# Patient Record
Sex: MALE | Race: WHITE | NOT HISPANIC OR LATINO | Employment: STUDENT | URBAN - METROPOLITAN AREA
[De-identification: names, ages, dates, MRNs, and addresses within clinical notes are randomized per-mention and may not be internally consistent; named-entity substitution may affect disease eponyms.]

---
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
## 2024-05-10 NOTE — PROGRESS NOTES
History of Present Illness  5/13/2024  ANYA is a 18 year old male, presenting for a routine ear cleaning. Doing well overall, he is on summer break from college.     11/20/2023  ANYA is a 18 year old male presenting for a follow-up ear cleaning. He is doing well and working out very often. He is still wearing his bilateral hearing aids. His right hearing aid is starting to irritate his ear, he thinks it needs to be molded again.     8/14/23  ANYA is a 17 year old male who presents for a follow-up ear cleaning. He is doing well. There are no concerns or complaints today.      04/24/2023:  ANYA is a 17 year old male who presents for a follow-up ear cleaning. He is doing well. There are no concerns or complaints today.      01/16/2023:  ANYA is a 17 year old male who presents for a follow-up for an ear cleaning. He is doing well. Patient plans to attend college next year. Patient continues to wear his hearing aids bilaterally.      10/10/2022:  ANYA is a 17 year old male who presents for a follow-up ear cleaning. Patient does report he has been needing people to repeat themselves when speaking to him. He continues to wear his hearing aids bilaterally without complication.      2/28/2022:  Anya is a 16 year old male here today for a routine ear cleaning. He denies any ear pain.      08/12/2021:  Anya is a 15 year old male here today for a routine ear cleaning. He is doing well  with his hearing aids and has no concerns today. No hearing changes, infections, or ear pain.   02/11/2021:  Anya is a 15 year old male here today for a routine ear cleaning. He has bilateral hearing aids and reports that he is doing well with these.     10/15/2020:   Anya Holder is a 15 year old male presenting for 4 month follow-up for cerumen impaction. He presents for his routine 12 week bilateral cerumen removal. He is doing well and reports no problems with his new hearing aids. He continues to be active and plays football.     "  06/11/2020:  Campos is a 14 year old male accompanied by his mother. He presents for a 12 week bilateral ear cleaning for cerumen impaction. Doing well at this visit. Hx of B/L SNHL and is using hearing aids.      02/12/2020:  Campos is a 14 year old male accompanied by his mother. He presents for a 12 week bilateral ear cleaning for cerumen impactions. No changes to speech or hearing. He states that he has been doing well. He has a history of sensorineural hearing loss and is using hearing aids. He states that his hearing is \"so much better\". He notices that he does not have as much ear wax as usual.      He is a freshman in [employer]     11/19/2019:  Campos is a 14 year old male accompanied by his mother. He presents for a bilateral ear cleaning for cerumen impactions. No changes to speech or hearing.      08/19/2019;  Campos is a 14 y/o male accompanied by his mother. He presents for cleaning of his bilateral ears. No changes to his speech or hearning.      05/20/2019: Here for an ear cleaning. doing well           Review of Systems     ENT and Constitutional systems have been reviewed and are negative for complaint except what is stated in the HPI and/or Past Medical History.      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)   · Sprain of ulnar collateral ligament of right elbow, subsequent encounter (V58.89,841.1)  (S53.441D)   · Well child visit (V20.2) (Z00.129)     Past Medical History     · History of dyspnea (V12.69) (Z87.898)   · Resolved Date: 22 Jun 2020   · History of shortness of " breath (V13.89) (Z87.898)   · Resolved Date: 22 Jun 2020   · History of Sensory hearing loss, bilateral (389.11) (H90.3)   · Resolved Date: 14 Feb 2021     Surgical History     · History of Surgery Testis   left testicle removed as it had atrophied     Allergies     · No Known Drug Allergies   Recorded By: Meghna Trujillo; 9/23/2013 3:40:55 PM     Current Meds  No current outpatient medications on file.     Physical Exam  Well appearing 18 year old in no apparent distress. Speech is age appropriate. Right External ear is normal. Ear Canal is normal. Left External ear is normal. Ear Canal is normal. Cerumen in left ear, suctioned today. He wears bilateral hearing aids. Eyes appear normal. External appearance of the nose is normal. Intranasal exam is normal. Lips, teeth and gums are normal. Respirations are quiet and easy. Skin over the face is normal.      Ear cerumen removal     Date/Time: 5/13/2024 3:25 pm     Performed by: Trevon Frederick MD  Authorized by: Trevon Frederick MD    Consent:     Consent obtained:  Verbal    Consent given by:  Patient    Risks discussed:  Dizziness and pain    Alternatives discussed:  No treatment  Procedure details:     Location:  left ear    Procedure type:   cerumen removal    Procedure type comment:  suction and curette    Procedure outcomes: cerumen removed    Post-procedure details:     Inspection:  No bleeding    Procedure completion:  Tolerated      Problem List Items Addressed This Visit       Bilateral impacted cerumen - Primary     Left side with mild impaction, removed via suction and curette  Right ear clear  Follow-up in 3-4 months before returning to school          Scribe Attestation  By signing my name below, IIda Scribe   attest that this documentation has been prepared under the direction and in the presence of Trevon Frederick MD.      Provider Attestation - Scribe documentation    All medical record entries made by the Jessicaibcher were at my direction and personally  dictated by me. I have reviewed the chart and agree that the record accurately reflects my personal performance of the history, physical exam, discussion and plan.     Reviewed and approved by DEION KENDRICK on 5/15/24 at 3:12 PM.

## 2024-05-13 ENCOUNTER — OFFICE VISIT (OUTPATIENT)
Dept: OTOLARYNGOLOGY | Facility: CLINIC | Age: 19
End: 2024-05-13
Payer: COMMERCIAL

## 2024-05-13 VITALS — BODY MASS INDEX: 24.08 KG/M2 | WEIGHT: 172 LBS | HEIGHT: 71 IN

## 2024-05-13 DIAGNOSIS — H61.23 BILATERAL IMPACTED CERUMEN: Primary | ICD-10-CM

## 2024-05-13 PROCEDURE — 69210 REMOVE IMPACTED EAR WAX UNI: CPT | Performed by: OTOLARYNGOLOGY

## 2024-05-13 NOTE — ASSESSMENT & PLAN NOTE
Left side with mild impaction, removed via suction and curette  Right ear clear  Follow-up in 3-4 months before returning to school

## 2024-08-12 ENCOUNTER — APPOINTMENT (OUTPATIENT)
Dept: OTOLARYNGOLOGY | Facility: CLINIC | Age: 19
End: 2024-08-12
Payer: COMMERCIAL

## 2024-09-09 ENCOUNTER — APPOINTMENT (OUTPATIENT)
Dept: OTOLARYNGOLOGY | Facility: CLINIC | Age: 19
End: 2024-09-09
Payer: COMMERCIAL

## 2024-10-11 ENCOUNTER — APPOINTMENT (OUTPATIENT)
Dept: OTOLARYNGOLOGY | Facility: HOSPITAL | Age: 19
End: 2024-10-11
Payer: COMMERCIAL

## 2024-10-11 VITALS — TEMPERATURE: 97.4 F | HEIGHT: 71 IN | BODY MASS INDEX: 24.44 KG/M2 | WEIGHT: 174.6 LBS

## 2024-10-11 DIAGNOSIS — H61.23 BILATERAL IMPACTED CERUMEN: Primary | ICD-10-CM

## 2024-10-11 DIAGNOSIS — H61.22 LEFT EAR IMPACTED CERUMEN: ICD-10-CM

## 2024-10-11 PROCEDURE — 69210 REMOVE IMPACTED EAR WAX UNI: CPT | Performed by: OTOLARYNGOLOGY

## 2024-10-11 NOTE — PROGRESS NOTES
Chief Complaint   Patient presents with    Follow-up        History Of Present Illness  10/11/2024:  Anya Holder is a 19 y.o. male presenting for his routine ear cleaning.     5/13/2024  ANYA is a 18 year old male, presenting for a routine ear cleaning. Doing well overall, he is on summer break from college.      11/20/2023  ANYA is a 18 year old male presenting for a follow-up ear cleaning. He is doing well and working out very often. He is still wearing his bilateral hearing aids. His right hearing aid is starting to irritate his ear, he thinks it needs to be molded again.      8/14/23  ANYA is a 17 year old male who presents for a follow-up ear cleaning. He is doing well. There are no concerns or complaints today.      04/24/2023:  ANYA is a 17 year old male who presents for a follow-up ear cleaning. He is doing well. There are no concerns or complaints today.      01/16/2023:  ANYA is a 17 year old male who presents for a follow-up for an ear cleaning. He is doing well. Patient plans to attend college next year. Patient continues to wear his hearing aids bilaterally.      10/10/2022:  ANYA is a 17 year old male who presents for a follow-up ear cleaning. Patient does report he has been needing people to repeat themselves when speaking to him. He continues to wear his hearing aids bilaterally without complication.      2/28/2022:  Anya is a 16 year old male here today for a routine ear cleaning. He denies any ear pain.      08/12/2021:  Anya is a 15 year old male here today for a routine ear cleaning. He is doing well  with his hearing aids and has no concerns today. No hearing changes, infections, or ear pain.   02/11/2021:  Anya is a 15 year old male here today for a routine ear cleaning. He has bilateral hearing aids and reports that he is doing well with these.     10/15/2020:   Anya Holder is a 15 year old male presenting for 4 month follow-up for cerumen impaction. He presents for his routine 12 week  "bilateral cerumen removal. He is doing well and reports no problems with his new hearing aids. He continues to be active and plays football.      06/11/2020:  Campos is a 14 year old male accompanied by his mother. He presents for a 12 week bilateral ear cleaning for cerumen impaction. Doing well at this visit. Hx of B/L SNHL and is using hearing aids.      02/12/2020:  Campos is a 14 year old male accompanied by his mother. He presents for a 12 week bilateral ear cleaning for cerumen impactions. No changes to speech or hearing. He states that he has been doing well. He has a history of sensorineural hearing loss and is using hearing aids. He states that his hearing is \"so much better\". He notices that he does not have as much ear wax as usual.      He is a freshman in [employer]     11/19/2019:  Campos is a 14 year old male accompanied by his mother. He presents for a bilateral ear cleaning for cerumen impactions. No changes to speech or hearing.      08/19/2019;  Campos is a 12 y/o male accompanied by his mother. He presents for cleaning of his bilateral ears. No changes to his speech or hearning.      05/20/2019: Here for an ear cleaning. doing well       Past Medical History  He has a past medical history of Personal history of other specified conditions (10/05/2018), Personal history of other specified conditions, and Sensorineural hearing loss, bilateral (02/14/2021).    Surgical History  He has a past surgical history that includes Other surgical history (06/23/2020).     Social History  He reports that he has never smoked. He has never used smokeless tobacco. No history on file for alcohol use and drug use.    Family History  No family history on file.     Allergies  Patient has no known allergies.    Review of Systems   All other systems reviewed and are negative.       Physical Exam    PHYSICAL EXAMINATION:  Ears:  External inspection of ears:  Right Ear  Right pinna normally formed and free of lesions. No " "preauricular pits. No mastoid tenderness.  Otoscopic examination: right auditory canal has normal appearance with cerumen obstruction. This was cleaned. No erythema. Tympanic membrane is mobile per pneumatic otoscopy, translucent, with clear landmarks and no evidence of middle ear effusion.   Left Ear  Left pinna normally formed and free of lesions. No preauricular pits. No mastoid tenderness.  Otoscopic examination: Left auditory canal has normal appearance and no significant cerumen obstruction. No erythema. Tympanic membrane is mobile per pneumatic otoscopy, translucent, with clear landmarks and no evidence of middle ear effusion.        Patient ID: Campos Holder is a 19 y.o. male.    Ear cerumen removal    Date/Time: 10/13/2024 9:03 AM    Performed by: Trevon Frederick MD  Authorized by: Trevon Frederick MD    Consent:     Consent obtained:  Verbal    Risks discussed:  Incomplete removal    Alternatives discussed:  No treatment  Procedure details:     Location:  L ear    Procedure type: curette      Procedure outcomes: cerumen removed    Post-procedure details:     Inspection:  No bleeding      Last Recorded Vitals  Temperature 36.3 °C (97.4 °F), temperature source Oral, height 1.803 m (5' 10.98\"), weight 79.2 kg (174 lb 9.7 oz).     Assessment/Plan   Problem List Items Addressed This Visit             ICD-10-CM       ENT    Bilateral impacted cerumen - Primary H61.23       Left ear cleaned today       Scribe Attestation  By signing my name below, ISwapna , Scribe attest that this documentation has been prepared under the direction and in the presence of Trevon Frederick MD.          Swapna Chambers  "

## 2024-10-13 PROCEDURE — 69210 REMOVE IMPACTED EAR WAX UNI: CPT | Performed by: OTOLARYNGOLOGY

## 2025-03-10 ENCOUNTER — APPOINTMENT (OUTPATIENT)
Facility: CLINIC | Age: 20
End: 2025-03-10
Payer: COMMERCIAL

## 2025-06-04 ENCOUNTER — APPOINTMENT (OUTPATIENT)
Facility: CLINIC | Age: 20
End: 2025-06-04
Payer: COMMERCIAL